# Patient Record
Sex: FEMALE | Race: BLACK OR AFRICAN AMERICAN | Employment: UNEMPLOYED | URBAN - METROPOLITAN AREA
[De-identification: names, ages, dates, MRNs, and addresses within clinical notes are randomized per-mention and may not be internally consistent; named-entity substitution may affect disease eponyms.]

---

## 2019-05-04 ENCOUNTER — HOSPITAL ENCOUNTER (EMERGENCY)
Age: 29
Discharge: HOME | End: 2019-05-04
Attending: EMERGENCY MEDICINE
Payer: MEDICAID

## 2019-05-04 VITALS
HEART RATE: 83 BPM | SYSTOLIC BLOOD PRESSURE: 109 MMHG | OXYGEN SATURATION: 98 % | RESPIRATION RATE: 16 BRPM | TEMPERATURE: 97.3 F | DIASTOLIC BLOOD PRESSURE: 75 MMHG

## 2019-05-04 DIAGNOSIS — K03.2 DENTAL EROSION EXTENDING INTO PULP: ICD-10-CM

## 2019-05-04 DIAGNOSIS — K08.89 DENTALGIA: ICD-10-CM

## 2019-05-04 DIAGNOSIS — K04.7 ABSCESS, PERIAPICAL: Primary | ICD-10-CM

## 2019-05-04 PROCEDURE — 99282 EMERGENCY DEPT VISIT SF MDM: CPT

## 2019-05-04 PROCEDURE — 74011250637 HC RX REV CODE- 250/637: Performed by: PHYSICIAN ASSISTANT

## 2019-05-04 RX ORDER — CLINDAMYCIN HYDROCHLORIDE 300 MG/1
300 CAPSULE ORAL 4 TIMES DAILY
Qty: 28 CAP | Refills: 0 | Status: SHIPPED | OUTPATIENT
Start: 2019-05-04 | End: 2019-05-11

## 2019-05-04 RX ORDER — HYDROCODONE BITARTRATE AND ACETAMINOPHEN 5; 325 MG/1; MG/1
1 TABLET ORAL
Qty: 12 TAB | Refills: 0 | Status: SHIPPED | OUTPATIENT
Start: 2019-05-04 | End: 2019-05-07

## 2019-05-04 RX ORDER — HYDROCODONE BITARTRATE AND ACETAMINOPHEN 5; 325 MG/1; MG/1
1 TABLET ORAL
Status: COMPLETED | OUTPATIENT
Start: 2019-05-04 | End: 2019-05-04

## 2019-05-04 RX ORDER — CLINDAMYCIN HYDROCHLORIDE 150 MG/1
300 CAPSULE ORAL
Status: COMPLETED | OUTPATIENT
Start: 2019-05-04 | End: 2019-05-04

## 2019-05-04 RX ADMIN — HYDROCODONE BITARTRATE AND ACETAMINOPHEN 1 TABLET: 5; 325 TABLET ORAL at 07:23

## 2019-05-04 RX ADMIN — CLINDAMYCIN HYDROCHLORIDE 300 MG: 150 CAPSULE ORAL at 07:23

## 2019-05-04 NOTE — ED PROVIDER NOTES
EMERGENCY DEPARTMENT HISTORY AND PHYSICAL EXAM 
 
Date: 5/4/2019 Patient Name: Jennifer Rodney History of Presenting Illness No chief complaint on file. History Provided By: Patient Chief Complaint: Patient Duration: 1 month worsening in the past 2 days Timing: Gradual 
Location: tooth 13 Quality: Pressure Severity: 10 out of 10 Modifying Factors: None Associated Symptoms: none Additional History (Context): Jennifer Rodney is a 26 y.o. female with a history of pelvic pain and periapical abscess who presents today for concerns of returning abscess. Patient states she is in need of root canal, has an appointment on May 22, 2019 but has had to make payments prior to the procedure. Patient states she was on clindamycin 2 weeks ago for similar occurrence, states abscess completely resolved. Patient states she is concerned abscesses returned. Patient denies trying anything for this at home. Patient denies fever, chills, shortness of breath, changes in voice, elevated tongue, sore throat, drooling or difficulties opening her mouth. PCP: None Current Facility-Administered Medications Medication Dose Route Frequency Provider Last Rate Last Dose  
 HYDROcodone-acetaminophen (NORCO) 5-325 mg per tablet 1 Tab  1 Tab Oral NOW Olvin Go [place]      
 clindamycin (CLEOCIN) capsule 300 mg  300 mg Oral NOW Shruti Goaida [place] Current Outpatient Medications Medication Sig Dispense Refill  clindamycin (CLEOCIN) 300 mg capsule Take 1 Cap by mouth four (4) times daily for 7 days. 28 Cap 0  
 HYDROcodone-acetaminophen (NORCO) 5-325 mg per tablet Take 1 Tab by mouth every six (6) hours as needed for Pain for up to 3 days. Max Daily Amount: 4 Tabs. 12 Tab 0  
 oxyCODONE-acetaminophen (PERCOCET) 5-325 mg per tablet Take 1-2 Tabs by mouth every four (4) hours as needed for Pain. Max Daily Amount: 12 Tabs.  Indications: Pain 30 Tab 0  
  mv,Ca,min/iron/FA/guarana/caff (ONE-A-DAY WOMEN'S ACTIVE PO) Take  by mouth.  ibuprofen (MOTRIN) 800 mg tablet Take  by mouth as needed for Pain.  ondansetron hcl (ZOFRAN, AS HYDROCHLORIDE,) 4 mg tablet Take 1 Tab by mouth every eight (8) hours as needed for Nausea. 12 Tab 0 Past History Past Medical History: 
Past Medical History:  
Diagnosis Date  Pelvic pain Past Surgical History: No past surgical history on file. Family History: No family history on file. Social History: 
Social History Tobacco Use  Smoking status: Former Smoker Packs/day: 0.25 Years: 5.00 Pack years: 1.25 Types: Cigarettes Last attempt to quit: 2016 Years since quitting: 3.3  Smokeless tobacco: Never Used Substance Use Topics  Alcohol use: Yes Comment: once a week  Drug use: No  
 
 
Allergies: 
No Known Allergies Review of Systems Review of Systems Constitutional: Negative for chills and fever. HENT: Positive for dental problem. Negative for congestion, rhinorrhea and sore throat. Respiratory: Negative for cough and shortness of breath. Cardiovascular: Negative for chest pain. Gastrointestinal: Negative for abdominal pain, blood in stool, constipation, diarrhea, nausea and vomiting. Genitourinary: Negative for dysuria, frequency and hematuria. Musculoskeletal: Negative for back pain and myalgias. Skin: Negative for rash and wound. Neurological: Negative for dizziness and headaches. All other systems reviewed and are negative. All Other Systems Negative Physical Exam  
 
Vitals:  
 05/04/19 8600 BP: 120/74 Pulse: 88 Resp: 16 Temp: 98.4 °F (36.9 °C) SpO2: 100% Physical Exam  
Constitutional: She is oriented to person, place, and time. She appears well-developed and well-nourished. No distress. HENT:  
Head: Normocephalic and atraumatic.   
Mouth/Throat: Uvula is midline, oropharynx is clear and moist and mucous membranes are normal. No trismus in the jaw. Abnormal dentition. No uvula swelling. No evidence of Ludwigs Eyes: Conjunctivae are normal.  
Neck: Normal range of motion. Neck supple. Cardiovascular: Normal rate, regular rhythm and normal heart sounds. Pulmonary/Chest: Effort normal and breath sounds normal. No respiratory distress. She exhibits no tenderness. Abdominal: Soft. Bowel sounds are normal. She exhibits no distension. There is no tenderness. There is no rebound and no guarding. Musculoskeletal: She exhibits no edema or deformity. Neurological: She is alert and oriented to person, place, and time. She has normal reflexes. Skin: Skin is warm and dry. She is not diaphoretic. Psychiatric: She has a normal mood and affect. Nursing note and vitals reviewed. Diagnostic Study Results Labs - No results found for this or any previous visit (from the past 12 hour(s)). Radiologic Studies - No orders to display CT Results  (Last 48 hours) None CXR Results  (Last 48 hours) None Medical Decision Making I am the first provider for this patient. I reviewed the vital signs, available nursing notes, past medical history, past surgical history, family history and social history. Vital Signs-Reviewed the patient's vital signs. Records Reviewed: Nursing Notes and Old Medical Records Procedures: None Procedures Provider Notes (Medical Decision Making): DDx: Ladena Busing, Dental caries,  Periodontal disease, dental fracture, gingivitis Periapical abscess, Trigeminal neuralgia,  space infection, Gustabo's angina, Retropharyngeal space infection, Infection after root canal, Dry Socket, Acute Necrotizing Ulcerative Gingivitis (ANUG). Plan: Patient history and physical exam consistent with periapical abscess, strongly encouraged I&D but patient denies.   Patient states she would prefer to try a course of antibiotics first.  I have encouraged patient to return if abscess does not resolve. Have stressed the importance of follow up with dentist, have advised patient to use tylenol and motrin as needed for pain and additional pain medication for breakthrough pain, will discharge home with abx and pain meds. Patient agrees with the plan and management and states all questions have been thoroughly answered and there are no more remaining questions. MED RECONCILIATION: 
Current Facility-Administered Medications Medication Dose Route Frequency  HYDROcodone-acetaminophen (NORCO) 5-325 mg per tablet 1 Tab  1 Tab Oral NOW  clindamycin (CLEOCIN) capsule 300 mg  300 mg Oral NOW Current Outpatient Medications Medication Sig  
 clindamycin (CLEOCIN) 300 mg capsule Take 1 Cap by mouth four (4) times daily for 7 days.  HYDROcodone-acetaminophen (NORCO) 5-325 mg per tablet Take 1 Tab by mouth every six (6) hours as needed for Pain for up to 3 days. Max Daily Amount: 4 Tabs.  oxyCODONE-acetaminophen (PERCOCET) 5-325 mg per tablet Take 1-2 Tabs by mouth every four (4) hours as needed for Pain. Max Daily Amount: 12 Tabs. Indications: Pain  
 mv,Ca,min/iron/FA/guarana/caff (ONE-A-DAY WOMEN'S ACTIVE PO) Take  by mouth.  ibuprofen (MOTRIN) 800 mg tablet Take  by mouth as needed for Pain.  ondansetron hcl (ZOFRAN, AS HYDROCHLORIDE,) 4 mg tablet Take 1 Tab by mouth every eight (8) hours as needed for Nausea. Disposition: 
Home DISCHARGE NOTE:  
Pt has been reexamined. Patient has no new complaints, changes, or physical findings. Care plan outlined and precautions discussed. Results of workup were reviewed with the patient. All medications were reviewed with the patient. All of pt's questions and concerns were addressed.  Patient was instructed and agrees to follow up with dentist as well as to return to the ED upon further deterioration. Patient is ready to go home. Follow-up Information Follow up With Specialties Details Why Contact Info [facility] EMERGENCY DEPT Emergency Medicine  As needed [address] [address] 
[phone] Slade Shear  In 2 days  [address] [address] 
[phone] Current Discharge Medication List  
  
START taking these medications Details  
clindamycin (CLEOCIN) 300 mg capsule Take 1 Cap by mouth four (4) times daily for 7 days. Qty: 28 Cap, Refills: 0 Associated Diagnoses: Abscess, periapical; Dentalgia; Dental erosion extending into pulp HYDROcodone-acetaminophen (NORCO) 5-325 mg per tablet Take 1 Tab by mouth every six (6) hours as needed for Pain for up to 3 days. Max Daily Amount: 4 Tabs. Qty: 12 Tab, Refills: 0 Associated Diagnoses: Abscess, periapical; Dentalgia; Dental erosion extending into pulp Diagnosis Clinical Impression: 1. Abscess, periapical   
2. Ashely Bradford 3. Dental erosion extending into pulp

## 2019-05-04 NOTE — DISCHARGE INSTRUCTIONS
Patient Education        Periodontal Conditions: Care Instructions  Your Care Instructions    Periodontal conditions affect the gums, bone, and tissue that surround and support the teeth. The most common problems are caused by plaque. Plaque is a thin film of bacteria that sticks to teeth above and below the gum line. It can build up and harden into tartar. The bacteria in plaque and tartar can cause gum disease. Gingivitis is a disease that affects the gums (gingiva). The gums are the soft tissue that surrounds the teeth. Gingivitis causes red, swollen, tender gums that bleed easily when brushed, persistent bad breath, and sensitive teeth. Because it is not painful, many people do not get treatment when they should. Gingivitis can be reversed with good dental care. Periodontitis is a more advanced disease that affects more than the gums. The gums pull away from the teeth. This leaves deep pockets where bacteria can grow. The disease can damage the bones that support the teeth. The teeth may get loose and fall out. A periodontal condition should be treated as soon as it is found. Finding gum problems early, treating them right away, and having regular checkups bring the best results. You can treat mild periodontal conditions by brushing and flossing your teeth every day. Your dentist may prescribe a mouthwash to kill the bacteria that can damage teeth and gums. Your dentist may have you take antibiotics to treat infection from moderate periodontal disease. If your gums have pulled away from your teeth, you may need cleaning between the teeth and gums right down to the teeth roots. This is called root planing and scaling. If you have severe periodontal disease, you may need surgery to remove diseased gum tissue or repair bone damage. Follow-up care is a key part of your treatment and safety. Be sure to make and go to all appointments, and call your dentist if you are having problems.  It's also a good idea to know your test results and keep a list of the medicines you take. How can you care for yourself at home? · If your dentist prescribed antibiotics, take them as directed. Do not stop taking them just because you feel better. You need to take the full course of antibiotics. · Brush your teeth twice a day, in the morning and at night. ? Use a toothbrush with soft, rounded-end bristles and a head that is small enough to reach all parts of your teeth and mouth. Replace your toothbrush every 3 to 4 months. ? Use a fluoride toothpaste. ? Place the brush at a 45-degree angle where the teeth meet the gums. Press firmly, and gently rock the brush back and forth using small circular movements. ? Brush chewing surfaces vigorously with short back-and-forth strokes. ? Brush your tongue from back to front. · Floss at least once a day. Choose the type and flavor that you like best.  · Have your teeth cleaned by a professional at least twice a year. · Ask your dentist about using an antibacterial mouthwash to help reduce bacteria. · Rinse your mouth with water or chew sugar-free gum after meals if you can't brush your teeth. · Do not smoke or use smokeless tobacco. Tobacco use can cause periodontal disease. When should you call for help? Call your dentist now or seek immediate medical care if:    · You have symptoms of infection, such as:  ? Increased pain, swelling, warmth, or redness. ? Red streaks leading from the area. ? Pus draining from the area. ? A fever.    Watch closely for changes in your health, and be sure to contact your dentist if:    · You have new or worse tooth pain.     · You do not get better as expected. Where can you learn more? Go to http://gilberto-lesli.info/. Enter H629 in the search box to learn more about \"Periodontal Conditions: Care Instructions. \"  Current as of: March 27, 2018  Content Version: 11.9  © 5465-0181 [employer].  Care instructions adapted under license by [employer] (which disclaims liability or warranty for this information). If you have questions about a medical condition or this instruction, always ask your healthcare professional. [address] any warranty or liability for your use of this information. Patient Education        Tooth and Gum Pain: Care Instructions  Your Care Instructions    The most common causes of dental pain are tooth decay and gum disease. Pain can also be caused by an infection of the tooth (abscess) or the gums. Or you may have pain from a broken or cracked tooth. Other causes of pain include infection and damage to a tooth from nervous grinding of your teeth. A wisdom tooth can be painful when it is coming in but cannot break through the gum. It can also be painful when the tooth is only partway in and extra gum tissue has formed around it. The tissue can get inflamed (pericoronitis), and sometimes it gets infected. Prompt dental care can help find the cause of your toothache and keep the tooth from dying or gum disease from getting worse. Self-care at home may reduce your pain and discomfort. Follow-up care is a key part of your treatment and safety. Be sure to make and go to all appointments, and call your dentist or doctor if you are having problems. It's also a good idea to know your test results and keep a list of the medicines you take. How can you care for yourself at home? · To reduce pain and facial swelling, put an ice or cold pack on the outside of your cheek for 10 to 20 minutes at a time. Put a thin cloth between the ice and your skin. Do not use heat. · If your doctor prescribed antibiotics, take them as directed. Do not stop taking them just because you feel better. You need to take the full course of antibiotics. · Ask your doctor if you can take an over-the-counter pain medicine, such as acetaminophen (Tylenol), ibuprofen (Advil, Motrin), or naproxen (Aleve). Be safe with medicines. Read and follow all instructions on the label. · Avoid very hot, cold, or sweet foods and drinks if they increase your pain. · Rinse your mouth with warm salt water every 2 hours to help relieve pain and swelling. Mix 1 teaspoon of salt in 8 ounces of water. · Talk to your dentist about using special toothpaste for sensitive teeth. To reduce pain on contact with heat or cold or when brushing, brush with this toothpaste regularly or rub a small amount of the paste on the sensitive area with a clean finger 2 or 3 times a day. Floss gently between your teeth. · Do not smoke or use spit tobacco. Tobacco use can make gum problems worse, decreases your ability to fight infection in your gums, and delays healing. If you need help quitting, talk to your doctor about stop-smoking programs and medicines. These can increase your chances of quitting for good. When should you call for help? Call 911 anytime you think you may need emergency care. For example, call if:    · You have trouble breathing.    Call your dentist or doctor now or seek immediate medical care if:    · You have signs of infection, such as:  ? Increased pain, swelling, warmth, or redness. ? Red streaks leading from the area. ? Pus draining from the area. ? A fever.    Watch closely for changes in your health, and be sure to contact your doctor if:    · You do not get better as expected. Where can you learn more? Go to http://gilberto-lesli.info/. Enter [id number] [id number] in the search box to learn more about \"Tooth and Gum Pain: Care Instructions. \"  Current as of: March 27, 2018  Content Version: 11.9  © 3726-8009 [employer]. Care instructions adapted under license by [employer] (which disclaims liability or warranty for this information).  If you have questions about a medical condition or this instruction, always ask your healthcare professional. Eyad Hernandez disclaims any warranty or liability for your use of this information.

## 2020-04-14 ENCOUNTER — HOSPITAL ENCOUNTER (EMERGENCY)
Age: 30
Discharge: HOME | End: 2020-04-14
Attending: EMERGENCY MEDICINE
Payer: MEDICAID

## 2020-04-14 VITALS
OXYGEN SATURATION: 98 % | SYSTOLIC BLOOD PRESSURE: 113 MMHG | HEIGHT: 66 IN | WEIGHT: 141 LBS | HEART RATE: 99 BPM | DIASTOLIC BLOOD PRESSURE: 57 MMHG | TEMPERATURE: 97.6 F | RESPIRATION RATE: 16 BRPM | BODY MASS INDEX: 22.66 KG/M2

## 2020-04-14 DIAGNOSIS — K04.7 DENTAL ABSCESS: Primary | ICD-10-CM

## 2020-04-14 PROCEDURE — 99281 EMR DPT VST MAYX REQ PHY/QHP: CPT

## 2020-04-14 PROCEDURE — 75810000289 HC I&D ABSCESS SIMP/COMP/MULT

## 2020-04-14 RX ORDER — IBUPROFEN 800 MG/1
800 TABLET ORAL EVERY 8 HOURS
Qty: 15 TAB | Refills: 0 | Status: SHIPPED | OUTPATIENT
Start: 2020-04-14 | End: 2020-04-19

## 2020-04-14 RX ORDER — OXYCODONE AND ACETAMINOPHEN 5; 325 MG/1; MG/1
1 TABLET ORAL
Qty: 6 TAB | Refills: 0 | Status: SHIPPED | OUTPATIENT
Start: 2020-04-14 | End: 2020-04-16

## 2020-04-14 RX ORDER — LIDOCAINE HYDROCHLORIDE 20 MG/ML
5 SOLUTION ORAL; TOPICAL
Qty: 200 ML | Refills: 0 | Status: SHIPPED | OUTPATIENT
Start: 2020-04-14

## 2020-04-14 RX ORDER — PENICILLIN V POTASSIUM 500 MG/1
500 TABLET, FILM COATED ORAL 4 TIMES DAILY
Qty: 40 TAB | Refills: 0 | Status: SHIPPED | OUTPATIENT
Start: 2020-04-14 | End: 2020-04-24

## 2020-04-14 RX ORDER — CHLORHEXIDINE GLUCONATE 1.2 MG/ML
15 RINSE ORAL EVERY 12 HOURS
Qty: 420 ML | Refills: 0 | Status: SHIPPED | OUTPATIENT
Start: 2020-04-14 | End: 2020-04-28

## 2020-04-14 RX ORDER — CLINDAMYCIN HYDROCHLORIDE 150 MG/1
300 CAPSULE ORAL 4 TIMES DAILY
Qty: 80 CAP | Refills: 0 | Status: SHIPPED | OUTPATIENT
Start: 2020-04-14 | End: 2020-04-24

## 2020-04-14 NOTE — DISCHARGE INSTRUCTIONS
Patient Education        Abscessed Tooth: Care Instructions  Your Care Instructions    An abscessed tooth is a tooth that has a pocket of pus in the tissues around it. Pus forms when the body tries to fight an infection caused by bacteria. If the pus cannot drain, it forms an abscess. An abscessed tooth can cause red, swollen gums and throbbing pain, especially when you chew. You may have a bad taste in your mouth and a fever, and your jaw may swell. Damage to the tooth, untreated tooth decay, or gum disease can cause an abscessed tooth. An abscessed tooth needs to be treated by a dental professional right away. If it is not treated, the infection could spread to other parts of your body. Your dentist will give you antibiotics to stop the infection. He or she may make a hole in the tooth or cut open (norah) the abscess inside your mouth so that the infection can drain, which should relieve your pain. You may need to have a root canal treatment, which tries to save your tooth by taking out the infected pulp and replacing it with a healing medicine and/or a filling. If these treatments do not work, your tooth may have to be removed. Follow-up care is a key part of your treatment and safety. Be sure to make and go to all appointments, and call your doctor if you are having problems. It's also a good idea to know your test results and keep a list of the medicines you take. How can you care for yourself at home? · Reduce pain and swelling in your face and jaw by putting ice or a cold pack on the outside of your cheek. Do this for 10 to 20 minutes at a time. Put a thin cloth between the ice and your skin. · Take pain medicines exactly as directed. ? If the doctor gave you a prescription medicine for pain, take it as prescribed. ? If you are not taking a prescription pain medicine, ask your doctor if you can take an over-the-counter medicine. · Take antibiotics as directed.  Do not stop taking them just because you feel better. You need to take the full course of antibiotics. To prevent tooth abscess  · Brush and floss every day. Have regular dental checkups. · Eat a healthy diet. Avoid sugary foods and drinks. · Do not smoke or vape with nicotine. And don't use spit tobacco. Tobacco and nicotine slow your ability to heal. They increase your risk for gum disease and cancer of the mouth and throat. If you need help quitting, talk to your doctor about stop-smoking programs and medicines. These can increase your chances of quitting for good. When should you call for help? Call 911 anytime you think you may need emergency care. For example, call if:    · You have trouble breathing.    Call your doctor now or seek immediate medical care if:    · You have new or worse symptoms of infection, such as:  ? Increased pain, swelling, warmth, or redness. ? Red streaks leading from the area. ? Pus draining from the area. ? A fever.    Watch closely for changes in your health, and be sure to contact your doctor if:    · You do not get better as expected. Where can you learn more? Go to http://gilberto-lesli.info/  Enter L466 in the search box to learn more about \"Abscessed Tooth: Care Instructions. \"  Current as of: July 28, 2019Content Version: 12.4  © 7208-1155 [employer]. Care instructions adapted under license by [employer] (which disclaims liability or warranty for this information). If you have questions about a medical condition or this instruction, always ask your healthcare professional. [place] [address] any warranty or liability for your use of this information.

## 2020-04-14 NOTE — ED PROVIDER NOTES
EMERGENCY DEPARTMENT HISTORY AND PHYSICAL EXAM    Date: 4/14/2020  Patient Name: Pierre Jimenez    History of Presenting Illness     No chief complaint on file. History Provided By: Patient  Additional History (Context): Pierre Jimenez is a 31 y.o. female with No significant past medical history who presents with upper left dental pain now x2+ days. Denies fever drooling trismus. Has adjacent gingival swelling pain and facial swelling as well. Last menstrual period 1 week ago. PCP: Astrid Lane MD    Current Outpatient Medications   Medication Sig Dispense Refill    ibuprofen (MOTRIN) 800 mg tablet Take 1 Tab by mouth every eight (8) hours for 5 days. 15 Tab 0    penicillin v potassium (VEETID) 500 mg tablet Take 1 Tab by mouth four (4) times daily for 10 days. 40 Tab 0    clindamycin (CLEOCIN) 150 mg capsule Take 2 Caps by mouth four (4) times daily for 10 days. 80 Cap 0    chlorhexidine (Peridex) 0.12 % solution 15 mL by Swish and Spit route every twelve (12) hours for 14 days. 420 mL 0    Lidocaine Viscous 2 % solution Take 5 mL by mouth two (2) times daily as needed for Pain. Put 5ml onto guaze and put in mouth; hold for 30min. Can repeat up to twice daily. Indications: administration of local anesthetic drug 200 mL 0    oxyCODONE-acetaminophen (Percocet) 5-325 mg per tablet Take 1 Tab by mouth every six (6) hours as needed for Pain for up to 2 days. Max Daily Amount: 4 Tabs. Take 1 tablet every 6 hours as needed for pain control. 6 Tab 0    oxyCODONE-acetaminophen (PERCOCET) 5-325 mg per tablet Take 1-2 Tabs by mouth every four (4) hours as needed for Pain. Max Daily Amount: 12 Tabs. Indications: Pain 30 Tab 0    mv,Ca,min/iron/FA/guarana/caff (ONE-A-DAY WOMEN'S ACTIVE PO) Take  by mouth.  ibuprofen (MOTRIN) 800 mg tablet Take  by mouth as needed for Pain.       ondansetron hcl (ZOFRAN, AS HYDROCHLORIDE,) 4 mg tablet Take 1 Tab by mouth every eight (8) hours as needed for Nausea. 12 Tab 0       Past History     Past Medical History:  Past Medical History:   Diagnosis Date    Pelvic pain        Past Surgical History:  No past surgical history on file. Family History:  No family history on file. Social History:  Social History     Tobacco Use    Smoking status: Former Smoker     Packs/day: 0.25     Years: 5.00     Pack years: 1.25     Types: Cigarettes     Last attempt to quit: 2016     Years since quittin.2    Smokeless tobacco: Never Used   Substance Use Topics    Alcohol use: Yes     Comment: once a week    Drug use: No       Allergies:  No Known Allergies      Review of Systems   Review of Systems   Constitutional: Negative for fever. HENT: Positive for dental problem and facial swelling. Negative for drooling. All Other Systems Negative  Physical Exam     Vitals:    20 1406   BP: 113/57   Pulse: 99   Resp: 16   Temp: 97.6 °F (36.4 °C)   SpO2: 98%   Weight: 64 kg (141 lb)   Height: 5' 6\" (1.676 m)     Physical Exam  Vitals signs and nursing note reviewed. Constitutional:       Appearance: She is well-developed. HENT:      Head: Normocephalic and atraumatic. Ears:      Comments: Dental: Left upper gingiva along the first premolar is fluctuant and tender. Willing or trismus. Eyes:      Pupils: Pupils are equal, round, and reactive to light. Neck:      Thyroid: No thyromegaly. Vascular: No JVD. Trachea: No tracheal deviation. Cardiovascular:      Rate and Rhythm: Normal rate and regular rhythm. Heart sounds: Normal heart sounds. No murmur. No friction rub. No gallop. Pulmonary:      Effort: Pulmonary effort is normal. No respiratory distress. Breath sounds: Normal breath sounds. No stridor. No wheezing or rales. Chest:      Chest wall: No tenderness. Abdominal:      General: There is no distension. Palpations: Abdomen is soft. There is no mass. Tenderness: There is no abdominal tenderness.  There is no guarding or rebound. Musculoskeletal:         General: No tenderness. Lymphadenopathy:      Cervical: No cervical adenopathy. Skin:     General: Skin is warm and dry. Coloration: Skin is not pale. Findings: No erythema or rash. Neurological:      Mental Status: She is alert and oriented to person, place, and time. Psychiatric:         Behavior: Behavior normal.         Thought Content: Thought content normal.                Diagnostic Study Results     Labs -   No results found for this or any previous visit (from the past 12 hour(s)). Radiologic Studies -   No orders to display     CT Results  (Last 48 hours)    None        CXR Results  (Last 48 hours)    None            Medical Decision Making   I am the first provider for this patient. I reviewed the vital signs, available nursing notes, past medical history, past surgical history, family history and social history. Vital Signs-Reviewed the patient's vital signs. Procedures:  I&D Abcess Simple  Date/Time: 4/14/2020 2:58 PM  Performed by: Romana Gale, PA  Authorized by: Romana Gale, PA     Consent:     Consent obtained:  Verbal    Consent given by:  Patient    Risks discussed:  Pain and incomplete drainage    Alternatives discussed:  Referral  Location:     Type:  Abscess    Size:  1cm    Location:  Mouth    Mouth location:  Alveolar process  Procedure details:     Needle aspiration: no      Incision types:  Stab incision    Scalpel blade:  11    Drainage:  Purulent  Post-procedure details:     Patient tolerance of procedure: Tolerated well, no immediate complications        Provider Notes (Medical Decision Making): I&D performed. Refer to dental for follow-up. Treat her abscess. MED RECONCILIATION:  No current facility-administered medications for this encounter. Current Outpatient Medications   Medication Sig    ibuprofen (MOTRIN) 800 mg tablet Take 1 Tab by mouth every eight (8) hours for 5 days.     penicillin v potassium (VEETID) 500 mg tablet Take 1 Tab by mouth four (4) times daily for 10 days.  clindamycin (CLEOCIN) 150 mg capsule Take 2 Caps by mouth four (4) times daily for 10 days.  chlorhexidine (Peridex) 0.12 % solution 15 mL by Swish and Spit route every twelve (12) hours for 14 days.  Lidocaine Viscous 2 % solution Take 5 mL by mouth two (2) times daily as needed for Pain. Put 5ml onto guaze and put in mouth; hold for 30min. Can repeat up to twice daily. Indications: administration of local anesthetic drug    oxyCODONE-acetaminophen (Percocet) 5-325 mg per tablet Take 1 Tab by mouth every six (6) hours as needed for Pain for up to 2 days. Max Daily Amount: 4 Tabs. Take 1 tablet every 6 hours as needed for pain control.  oxyCODONE-acetaminophen (PERCOCET) 5-325 mg per tablet Take 1-2 Tabs by mouth every four (4) hours as needed for Pain. Max Daily Amount: 12 Tabs. Indications: Pain    mv,Ca,min/iron/FA/guarana/caff (ONE-A-DAY WOMEN'S ACTIVE PO) Take  by mouth.  ibuprofen (MOTRIN) 800 mg tablet Take  by mouth as needed for Pain.  ondansetron hcl (ZOFRAN, AS HYDROCHLORIDE,) 4 mg tablet Take 1 Tab by mouth every eight (8) hours as needed for Nausea. Disposition:  home    DISCHARGE NOTE:   3:00 PM    Pt has been reexamined. Patient has no new complaints, changes, or physical findings. Care plan outlined and precautions discussed. Results of exam were reviewed with the patient. All medications were reviewed with the patient; will d/c home with see below. All of pt's questions and concerns were addressed. Patient was instructed and agrees to follow up with dental, as well as to return to the ED upon further deterioration. Patient is ready to go home.     Follow-up Information     Follow up With Specialties Details Why [facility]  Schedule an appointment as soon as possible for a visit in 1 day  [address] [place] [address],[address]  Schedule an appointment as soon as possible for a visit in 1 day  [address] [facility] [address]  [phone]    [facility] EMERGENCY DEPT Emergency Medicine  If symptoms worsen return immediately [address] [place] [address]  [phone]          Current Discharge Medication List      START taking these medications    Details   !! ibuprofen (MOTRIN) 800 mg tablet Take 1 Tab by mouth every eight (8) hours for 5 days. Qty: 15 Tab, Refills: 0      penicillin v potassium (VEETID) 500 mg tablet Take 1 Tab by mouth four (4) times daily for 10 days. Qty: 40 Tab, Refills: 0      clindamycin (CLEOCIN) 150 mg capsule Take 2 Caps by mouth four (4) times daily for 10 days. Qty: 80 Cap, Refills: 0      chlorhexidine (Peridex) 0.12 % solution 15 mL by Swish and Spit route every twelve (12) hours for 14 days. Qty: 420 mL, Refills: 0      Lidocaine Viscous 2 % solution Take 5 mL by mouth two (2) times daily as needed for Pain. Put 5ml onto guaze and put in mouth; hold for 30min. Can repeat up to twice daily. Indications: administration of local anesthetic drug  Qty: 200 mL, Refills: 0      !! oxyCODONE-acetaminophen (Percocet) 5-325 mg per tablet Take 1 Tab by mouth every six (6) hours as needed for Pain for up to 2 days. Max Daily Amount: 4 Tabs. Take 1 tablet every 6 hours as needed for pain control. Qty: 6 Tab, Refills: 0    Associated Diagnoses: Dental abscess       !! - Potential duplicate medications found. Please discuss with provider. CONTINUE these medications which have NOT CHANGED    Details   !! oxyCODONE-acetaminophen (PERCOCET) 5-325 mg per tablet Take 1-2 Tabs by mouth every four (4) hours as needed for Pain. Max Daily Amount: 12 Tabs. Indications: Pain  Qty: 30 Tab, Refills: 0    Associated Diagnoses: S/P laparoscopy      !! ibuprofen (MOTRIN) 800 mg tablet Take  by mouth as needed for Pain.        !! - Potential duplicate medications found. Please discuss with provider. Diagnosis     Clinical Impression:   1.  Dental abscess